# Patient Record
Sex: FEMALE | Race: WHITE | NOT HISPANIC OR LATINO | Employment: FULL TIME | URBAN - METROPOLITAN AREA
[De-identification: names, ages, dates, MRNs, and addresses within clinical notes are randomized per-mention and may not be internally consistent; named-entity substitution may affect disease eponyms.]

---

## 2023-09-27 LAB — URINE CULTURE: NORMAL

## 2023-11-21 ENCOUNTER — OFFICE VISIT (OUTPATIENT)
Dept: PRIMARY CARE | Facility: CLINIC | Age: 33
End: 2023-11-21
Payer: COMMERCIAL

## 2023-11-21 VITALS
DIASTOLIC BLOOD PRESSURE: 108 MMHG | SYSTOLIC BLOOD PRESSURE: 140 MMHG | TEMPERATURE: 96.9 F | HEIGHT: 64 IN | WEIGHT: 260.1 LBS | OXYGEN SATURATION: 99 % | BODY MASS INDEX: 44.41 KG/M2 | HEART RATE: 79 BPM

## 2023-11-21 DIAGNOSIS — F41.1 GENERALIZED ANXIETY DISORDER: ICD-10-CM

## 2023-11-21 DIAGNOSIS — E66.01 CLASS 3 SEVERE OBESITY DUE TO EXCESS CALORIES WITH SERIOUS COMORBIDITY AND BODY MASS INDEX (BMI) OF 40.0 TO 44.9 IN ADULT (MULTI): ICD-10-CM

## 2023-11-21 DIAGNOSIS — K21.9 GASTROESOPHAGEAL REFLUX DISEASE, UNSPECIFIED WHETHER ESOPHAGITIS PRESENT: ICD-10-CM

## 2023-11-21 DIAGNOSIS — I10 BENIGN ESSENTIAL HYPERTENSION: Primary | ICD-10-CM

## 2023-11-21 DIAGNOSIS — E78.2 MIXED HYPERLIPIDEMIA: ICD-10-CM

## 2023-11-21 PROBLEM — G43.909 MIGRAINES: Status: ACTIVE | Noted: 2023-11-21

## 2023-11-21 PROBLEM — N94.6 DYSMENORRHEA: Status: ACTIVE | Noted: 2023-11-21

## 2023-11-21 PROBLEM — J31.0 CHRONIC RHINITIS: Status: ACTIVE | Noted: 2023-11-21

## 2023-11-21 PROBLEM — J34.2 NASAL SEPTAL DEVIATION: Status: ACTIVE | Noted: 2023-11-21

## 2023-11-21 PROBLEM — R00.2 PALPITATIONS: Status: ACTIVE | Noted: 2023-11-21

## 2023-11-21 PROBLEM — M54.50 LUMBAGO: Status: ACTIVE | Noted: 2023-11-21

## 2023-11-21 PROBLEM — F41.9 ANXIETY DISORDER: Status: ACTIVE | Noted: 2023-11-21

## 2023-11-21 PROBLEM — J30.2 SEASONAL ALLERGIC RHINITIS: Status: ACTIVE | Noted: 2023-11-21

## 2023-11-21 PROCEDURE — 3077F SYST BP >= 140 MM HG: CPT | Performed by: INTERNAL MEDICINE

## 2023-11-21 PROCEDURE — 99214 OFFICE O/P EST MOD 30 MIN: CPT | Performed by: INTERNAL MEDICINE

## 2023-11-21 PROCEDURE — 3008F BODY MASS INDEX DOCD: CPT | Performed by: INTERNAL MEDICINE

## 2023-11-21 PROCEDURE — 3080F DIAST BP >= 90 MM HG: CPT | Performed by: INTERNAL MEDICINE

## 2023-11-21 PROCEDURE — 80307 DRUG TEST PRSMV CHEM ANLYZR: CPT

## 2023-11-21 PROCEDURE — 1036F TOBACCO NON-USER: CPT | Performed by: INTERNAL MEDICINE

## 2023-11-21 RX ORDER — HYDROCHLOROTHIAZIDE 25 MG/1
25 TABLET ORAL DAILY
COMMUNITY
End: 2023-11-21 | Stop reason: SDUPTHER

## 2023-11-21 RX ORDER — LORAZEPAM 0.5 MG/1
0.5 TABLET ORAL EVERY 6 HOURS PRN
Qty: 30 TABLET | Refills: 0 | Status: SHIPPED | OUTPATIENT
Start: 2023-11-21

## 2023-11-21 RX ORDER — HYDROCHLOROTHIAZIDE 25 MG/1
25 TABLET ORAL DAILY
Qty: 90 TABLET | Refills: 3 | Status: SHIPPED | OUTPATIENT
Start: 2023-11-21 | End: 2024-11-20

## 2023-11-21 RX ORDER — NAPROXEN 500 MG/1
1 TABLET ORAL EVERY 12 HOURS PRN
COMMUNITY
Start: 2020-05-13

## 2023-11-21 RX ORDER — CITALOPRAM 10 MG/1
10 TABLET ORAL DAILY
COMMUNITY
End: 2023-11-21 | Stop reason: SDUPTHER

## 2023-11-21 RX ORDER — DESOGESTREL AND ETHINYL ESTRADIOL 7 DAYS X 3
1 KIT ORAL DAILY
COMMUNITY

## 2023-11-21 RX ORDER — LORAZEPAM 0.5 MG/1
0.5 TABLET ORAL EVERY 6 HOURS PRN
COMMUNITY
End: 2023-11-21 | Stop reason: SDUPTHER

## 2023-11-21 RX ORDER — CITALOPRAM 10 MG/1
10 TABLET ORAL DAILY
Qty: 90 TABLET | Refills: 3 | Status: SHIPPED | OUTPATIENT
Start: 2023-11-21

## 2023-11-21 RX ORDER — NEBIVOLOL 10 MG/1
10 TABLET ORAL DAILY
Qty: 90 TABLET | Refills: 3 | Status: SHIPPED | OUTPATIENT
Start: 2023-11-21 | End: 2024-11-20

## 2023-11-21 RX ORDER — SEMAGLUTIDE 0.25 MG/.5ML
0.25 INJECTION, SOLUTION SUBCUTANEOUS
Qty: 2 ML | Refills: 0 | Status: SHIPPED | OUTPATIENT
Start: 2023-11-21 | End: 2023-12-13

## 2023-11-21 RX ORDER — OMEPRAZOLE 20 MG/1
20 CAPSULE, DELAYED RELEASE ORAL DAILY
Qty: 90 CAPSULE | Refills: 3 | Status: SHIPPED | OUTPATIENT
Start: 2023-11-21 | End: 2024-11-20

## 2023-11-21 RX ORDER — OMEPRAZOLE 20 MG/1
20 CAPSULE, DELAYED RELEASE ORAL DAILY
COMMUNITY
End: 2023-11-21 | Stop reason: SDUPTHER

## 2023-11-21 RX ORDER — ATENOLOL 50 MG/1
50 TABLET ORAL DAILY
COMMUNITY
End: 2023-11-21

## 2023-11-21 ASSESSMENT — PATIENT HEALTH QUESTIONNAIRE - PHQ9
2. FEELING DOWN, DEPRESSED OR HOPELESS: NOT AT ALL
SUM OF ALL RESPONSES TO PHQ9 QUESTIONS 1 AND 2: 0
1. LITTLE INTEREST OR PLEASURE IN DOING THINGS: NOT AT ALL

## 2023-11-21 NOTE — PROGRESS NOTES
Subjective   Patient ID: Sherita Will is a 33 y.o. female who presents for Anxiety.    HPI     History reviewed and updated.  Needs refills on medication.  Has been a stressful year.  Her landlord decided to sell the home she was living in and she had to move out.  She is now in a new [facility], but misses her old place.  She is not fully unpacked, does not know where her blood pressure cuff is at home.  She is trying to follow healthy diet and goes on walks with dog.  Very hard for her to sustain any permanent weight loss.  She would like to try Wegovy.  She also wants to restart Ativan PRN for anxiety - helps her a lot.  She was unable to come in every 90 days last year due to her schedule, but she wants to get back on track because the medication helps her.    OARRS:  Payal Jewell MD on 11/21/2023  3:09 PM  I have personally reviewed the OARRS report for Sherita Will. I have considered the risks of abuse, dependence, addiction and diversion and I believe that it is clinically appropriate for Sherita Will to be prescribed this medication    Is the patient prescribed a combination of a benzodiazepine and opioid?  No    Last Urine Drug Screen / ordered today: Yes  Recent Results (from the past 8760 hour(s))   Drug Screen, Urine With Reflex to Confirmation    Collection Time: 11/21/23  3:32 PM   Result Value Ref Range    Amphetamine Screen, Urine Presumptive Negative Presumptive Negative    Barbiturate Screen, Urine Presumptive Negative Presumptive Negative    Benzodiazepines Screen, Urine Presumptive Negative Presumptive Negative    Cannabinoid Screen, Urine Presumptive Negative Presumptive Negative    Cocaine Metabolite Screen, Urine Presumptive Negative Presumptive Negative    Fentanyl Screen, Urine Presumptive Negative Presumptive Negative    Opiate Screen, Urine Presumptive Negative Presumptive Negative    Oxycodone Screen, Urine Presumptive Negative Presumptive Negative    PCP Screen, Urine  Presumptive Negative Presumptive Negative   OPIATE/OPIOID/BENZO PRESCRIPTION COMPLIANCE    Collection Time: 12/07/22  3:43 PM   Result Value Ref Range    DRUG SCREEN COMMENT URINE SEE BELOW     Creatine, Urine 162.6 mg/dL    Amphetamine Screen, Urine PRESUMPTIVE NEGATIVE NEGATIVE    Barbiturate Screen, Urine PRESUMPTIVE NEGATIVE NEGATIVE    Cannabinoid Screen, Urine PRESUMPTIVE NEGATIVE NEGATIVE    Cocaine Screen, Urine PRESUMPTIVE NEGATIVE NEGATIVE    PCP Screen, Urine PRESUMPTIVE NEGATIVE NEGATIVE    7-Aminoclonazepam <25 Cutoff <25 ng/mL    Alpha-Hydroxyalprazolam <25 Cutoff <25 ng/mL    Alpha-Hydroxymidazolam <25 Cutoff <25 ng/mL    Alprazolam <25 Cutoff <25 ng/mL    Chlordiazepoxide <25 Cutoff <25 ng/mL    Clonazepam <25 Cutoff <25 ng/mL    Diazepam <25 Cutoff <25 ng/mL    Lorazepam 61 (A) Cutoff <25 ng/mL    Midazolam <25 Cutoff <25 ng/mL    Nordiazepam <25 Cutoff <25 ng/mL    Oxazepam <25 Cutoff <25 ng/mL    Temazepam <25 Cutoff <25 ng/mL    Zolpidem <25 Cutoff <25 ng/mL    Zolpidem Metabolite (ZCA) <25 Cutoff <25 ng/mL    6-Acetylmorphine <25 Cutoff <25 ng/mL    Codeine <50 Cutoff <50 ng/mL    Hydrocodone <25 Cutoff <25 ng/mL    Hydromorphone <25 Cutoff <25 ng/mL    Morphine Urine <50 Cutoff <50 ng/mL    Norhydrocodone <25 Cutoff <25 ng/mL    Noroxycodone <25 Cutoff <25 ng/mL    Oxycodone <25 Cutoff <25 ng/mL    Oxymorphone <25 Cutoff <25 ng/mL    Tramadol <50 Cutoff <50 ng/mL    O-Desmethyltramadol <50 Cutoff <50 ng/mL    Fentanyl <2.5 Cutoff<2.5 ng/mL    Norfentanyl <2.5 Cutoff<2.5 ng/mL    METHADONE CONFIRMATION,URINE <25 Cutoff <25 ng/mL    EDDP <25 Cutoff <25 ng/mL     Results are as expected.         Controlled Substance Agreement:  Date of the Last Agreement: 11/21/2023  Reviewed Controlled Substance Agreement including but not limited to the benefits, risks, and alternatives to treatment with a Controlled Substance medication(s).    Benzodiazepines:  What is the patient's goal of therapy? Relief of  "acute anxiety  Is this being achieved with current treatment? yes    RA-7:  No data recorded    Activities of Daily Living:   Is your overall impression that this patient is benefiting (symptom reduction outweighs side effects) from benzodiazepine therapy? Yes     1. Physical Functioning: Same  2. Family Relationship: same    3. Social Relationship: Same  4. Mood: Better  5. Sleep Patterns: Better  6. Overall Function: Better    Review of Systems   Constitutional:  Negative for chills, fatigue and fever.   HENT:  Negative for sore throat.    Respiratory:  Negative for cough and shortness of breath.    Cardiovascular:  Negative for chest pain, palpitations and leg swelling.   Gastrointestinal:  Negative for abdominal pain, constipation, diarrhea, nausea and vomiting.   Genitourinary:  Negative for dysuria and hematuria.   Musculoskeletal:  Negative for arthralgias, back pain and gait problem.   Skin:  Negative for rash.   Neurological:  Negative for dizziness, light-headedness and headaches.   Psychiatric/Behavioral:  Negative for confusion. The patient is nervous/anxious.        Objective   BP (!) 140/108   Pulse 79   Temp 36.1 °C (96.9 °F)   Ht 1.626 m (5' 4\")   Wt 118 kg (260 lb 1.6 oz)   SpO2 99%   BMI 44.65 kg/m²     Physical Exam  Constitutional:       Appearance: Normal appearance. She is obese.   HENT:      Head: Normocephalic and atraumatic.      Right Ear: Tympanic membrane and ear canal normal.      Left Ear: Tympanic membrane and ear canal normal.   Cardiovascular:      Rate and Rhythm: Normal rate and regular rhythm.      Pulses: Normal pulses.      Heart sounds: No murmur heard.  Pulmonary:      Effort: Pulmonary effort is normal. No respiratory distress.      Breath sounds: Normal breath sounds. No wheezing.   Abdominal:      General: There is no distension.      Palpations: Abdomen is soft.      Tenderness: There is no abdominal tenderness.   Musculoskeletal:      Right lower leg: No edema.     "  Left lower leg: No edema.   Skin:     Findings: No rash.   Neurological:      General: No focal deficit present.      Mental Status: She is alert and oriented to person, place, and time. Mental status is at baseline.   Psychiatric:         Mood and Affect: Mood normal.         Behavior: Behavior normal.         Thought Content: Thought content normal.         Judgment: Judgment normal.         Assessment/Plan   Problem List Items Addressed This Visit             ICD-10-CM    Anxiety disorder F41.9    Relevant Medications    citalopram (CeleXA) 10 mg tablet    LORazepam (Ativan) 0.5 mg tablet    Other Relevant Orders    Drug Screen, Urine With Reflex to Confirmation (Completed)    Benign essential hypertension - Primary I10    Relevant Medications    nebivolol (Bystolic) 10 mg tablet    hydroCHLOROthiazide (HYDRODiuril) 25 mg tablet    Other Relevant Orders    CBC and Auto Differential    GERD (gastroesophageal reflux disease) K21.9    Relevant Medications    omeprazole (PriLOSEC) 20 mg DR capsule    Mixed hyperlipidemia E78.2    Relevant Orders    Comprehensive Metabolic Panel    Lipid Panel     Other Visit Diagnoses         Codes    Class 3 severe obesity due to excess calories with serious comorbidity and body mass index (BMI) of 40.0 to 44.9 in adult (CMS/[facility])     E66.01, Z68.41    Relevant Medications    semaglutide, weight loss, (Wegovy) 0.25 mg/0.5 mL pen injector            Anxiety disorder - well controlled with citalopram and lorazepam PRN.  Controlled substance agreement reviewed and updated 11/21/23.  OARRS reviewed.  Urine Drug Screen 11/21/23.  Lorazepam refilled.  Follow-up in 90 days.    Hypertension - uncontrolled / not at goal.  Change atenolol to Bystolic, continue hydrochlorothiazide.    Mixed hyperlipidemia- check labs.    Obesity, BMI 44 - has tried tried traditional diet and exercise without sustained weight.  I recommend Wegovy, Rx sent.    GERD - refill sent for omeprazole.    Follow-up in  90 days and PRN.

## 2023-11-22 LAB
AMPHETAMINES UR QL SCN: NORMAL
BARBITURATES UR QL SCN: NORMAL
BENZODIAZ UR QL SCN: NORMAL
BZE UR QL SCN: NORMAL
CANNABINOIDS UR QL SCN: NORMAL
FENTANYL+NORFENTANYL UR QL SCN: NORMAL
OPIATES UR QL SCN: NORMAL
OXYCODONE+OXYMORPHONE UR QL SCN: NORMAL
PCP UR QL SCN: NORMAL

## 2023-11-22 ASSESSMENT — ENCOUNTER SYMPTOMS
NERVOUS/ANXIOUS: 1
PALPITATIONS: 0
BACK PAIN: 0
SHORTNESS OF BREATH: 0
FATIGUE: 0
DYSURIA: 0
DIZZINESS: 0
HEADACHES: 0
CONSTIPATION: 0
DIARRHEA: 0
ARTHRALGIAS: 0
CONFUSION: 0
CHILLS: 0
COUGH: 0
VOMITING: 0
LIGHT-HEADEDNESS: 0
SORE THROAT: 0
HEMATURIA: 0
ABDOMINAL PAIN: 0
FEVER: 0
NAUSEA: 0

## 2024-02-15 ENCOUNTER — APPOINTMENT (OUTPATIENT)
Dept: PRIMARY CARE | Facility: CLINIC | Age: 34
End: 2024-02-15
Payer: COMMERCIAL

## 2024-03-04 ENCOUNTER — APPOINTMENT (OUTPATIENT)
Dept: PRIMARY CARE | Facility: CLINIC | Age: 34
End: 2024-03-04
Payer: COMMERCIAL

## 2024-04-01 ENCOUNTER — APPOINTMENT (OUTPATIENT)
Dept: PRIMARY CARE | Facility: CLINIC | Age: 34
End: 2024-04-01
Payer: COMMERCIAL

## 2024-04-02 ENCOUNTER — APPOINTMENT (OUTPATIENT)
Dept: PRIMARY CARE | Facility: CLINIC | Age: 34
End: 2024-04-02
Payer: COMMERCIAL

## 2024-04-29 ENCOUNTER — APPOINTMENT (OUTPATIENT)
Dept: PRIMARY CARE | Facility: CLINIC | Age: 34
End: 2024-04-29
Payer: COMMERCIAL

## 2024-06-04 ENCOUNTER — APPOINTMENT (OUTPATIENT)
Dept: PRIMARY CARE | Facility: CLINIC | Age: 34
End: 2024-06-04
Payer: COMMERCIAL

## 2024-06-17 ENCOUNTER — APPOINTMENT (OUTPATIENT)
Dept: PRIMARY CARE | Facility: CLINIC | Age: 34
End: 2024-06-17
Payer: COMMERCIAL

## 2024-06-26 ENCOUNTER — APPOINTMENT (OUTPATIENT)
Dept: PRIMARY CARE | Facility: CLINIC | Age: 34
End: 2024-06-26
Payer: COMMERCIAL

## 2024-07-08 ENCOUNTER — APPOINTMENT (OUTPATIENT)
Dept: PRIMARY CARE | Facility: CLINIC | Age: 34
End: 2024-07-08
Payer: COMMERCIAL

## 2024-08-15 ENCOUNTER — APPOINTMENT (OUTPATIENT)
Dept: PRIMARY CARE | Facility: CLINIC | Age: 34
End: 2024-08-15
Payer: COMMERCIAL

## 2024-08-19 ENCOUNTER — APPOINTMENT (OUTPATIENT)
Dept: PRIMARY CARE | Facility: CLINIC | Age: 34
End: 2024-08-19
Payer: COMMERCIAL

## 2024-09-09 ENCOUNTER — APPOINTMENT (OUTPATIENT)
Dept: PRIMARY CARE | Facility: CLINIC | Age: 34
End: 2024-09-09
Payer: COMMERCIAL

## 2024-09-30 ENCOUNTER — APPOINTMENT (OUTPATIENT)
Dept: PRIMARY CARE | Facility: CLINIC | Age: 34
End: 2024-09-30
Payer: COMMERCIAL

## 2024-10-07 ENCOUNTER — APPOINTMENT (OUTPATIENT)
Dept: PRIMARY CARE | Facility: CLINIC | Age: 34
End: 2024-10-07
Payer: COMMERCIAL

## 2024-10-31 ENCOUNTER — APPOINTMENT (OUTPATIENT)
Dept: PRIMARY CARE | Facility: CLINIC | Age: 34
End: 2024-10-31
Payer: COMMERCIAL

## 2024-11-25 DIAGNOSIS — I10 BENIGN ESSENTIAL HYPERTENSION: ICD-10-CM

## 2024-11-25 RX ORDER — NEBIVOLOL 10 MG/1
10 TABLET ORAL DAILY
Qty: 90 TABLET | Refills: 0 | Status: SHIPPED | OUTPATIENT
Start: 2024-11-25

## 2024-12-02 ENCOUNTER — APPOINTMENT (OUTPATIENT)
Dept: PRIMARY CARE | Facility: CLINIC | Age: 34
End: 2024-12-02
Payer: COMMERCIAL

## 2024-12-02 VITALS
DIASTOLIC BLOOD PRESSURE: 95 MMHG | WEIGHT: 228.5 LBS | TEMPERATURE: 97.3 F | HEIGHT: 64 IN | SYSTOLIC BLOOD PRESSURE: 116 MMHG | BODY MASS INDEX: 39.01 KG/M2 | OXYGEN SATURATION: 99 % | HEART RATE: 91 BPM

## 2024-12-02 DIAGNOSIS — I10 BENIGN ESSENTIAL HYPERTENSION: ICD-10-CM

## 2024-12-02 DIAGNOSIS — K21.9 GASTROESOPHAGEAL REFLUX DISEASE, UNSPECIFIED WHETHER ESOPHAGITIS PRESENT: ICD-10-CM

## 2024-12-02 DIAGNOSIS — F41.1 GENERALIZED ANXIETY DISORDER: Primary | ICD-10-CM

## 2024-12-02 DIAGNOSIS — Z79.899 CHRONIC PRESCRIPTION BENZODIAZEPINE USE: ICD-10-CM

## 2024-12-02 PROCEDURE — 3008F BODY MASS INDEX DOCD: CPT | Performed by: INTERNAL MEDICINE

## 2024-12-02 PROCEDURE — 3080F DIAST BP >= 90 MM HG: CPT | Performed by: INTERNAL MEDICINE

## 2024-12-02 PROCEDURE — 80307 DRUG TEST PRSMV CHEM ANLYZR: CPT

## 2024-12-02 PROCEDURE — 99214 OFFICE O/P EST MOD 30 MIN: CPT | Performed by: INTERNAL MEDICINE

## 2024-12-02 PROCEDURE — 1036F TOBACCO NON-USER: CPT | Performed by: INTERNAL MEDICINE

## 2024-12-02 PROCEDURE — 3074F SYST BP LT 130 MM HG: CPT | Performed by: INTERNAL MEDICINE

## 2024-12-02 RX ORDER — NEBIVOLOL 10 MG/1
10 TABLET ORAL DAILY
Qty: 30 TABLET | Refills: 11 | Status: SHIPPED | OUTPATIENT
Start: 2024-12-02

## 2024-12-02 RX ORDER — CITALOPRAM 10 MG/1
10 TABLET ORAL DAILY
Qty: 30 TABLET | Refills: 11 | Status: SHIPPED | OUTPATIENT
Start: 2024-12-02

## 2024-12-02 RX ORDER — HYDROCHLOROTHIAZIDE 25 MG/1
25 TABLET ORAL DAILY
Qty: 30 TABLET | Refills: 11 | Status: SHIPPED | OUTPATIENT
Start: 2024-12-02 | End: 2025-12-02

## 2024-12-02 RX ORDER — LORAZEPAM 0.5 MG/1
0.5 TABLET ORAL EVERY 6 HOURS PRN
Qty: 30 TABLET | Refills: 0 | Status: SHIPPED | OUTPATIENT
Start: 2024-12-02

## 2024-12-02 RX ORDER — OMEPRAZOLE 20 MG/1
20 CAPSULE, DELAYED RELEASE ORAL DAILY
Qty: 30 CAPSULE | Refills: 11 | Status: SHIPPED | OUTPATIENT
Start: 2024-12-02 | End: 2025-12-02

## 2024-12-02 ASSESSMENT — ENCOUNTER SYMPTOMS
SHORTNESS OF BREATH: 0
CHILLS: 0
BACK PAIN: 0
PALPITATIONS: 0
ABDOMINAL PAIN: 0
HEMATURIA: 0
CONSTIPATION: 0
ARTHRALGIAS: 0
DYSURIA: 0
VOMITING: 0
CONFUSION: 0
SORE THROAT: 0
DIARRHEA: 0
COUGH: 0
DIZZINESS: 0
DYSPHORIC MOOD: 0
LIGHT-HEADEDNESS: 0
NAUSEA: 0
FATIGUE: 0
FEVER: 0
HEADACHES: 0

## 2024-12-02 ASSESSMENT — PATIENT HEALTH QUESTIONNAIRE - PHQ9
SUM OF ALL RESPONSES TO PHQ9 QUESTIONS 1 AND 2: 0
2. FEELING DOWN, DEPRESSED OR HOPELESS: NOT AT ALL
1. LITTLE INTEREST OR PLEASURE IN DOING THINGS: NOT AT ALL

## 2024-12-02 NOTE — PROGRESS NOTES
CHIEF COMPLAINT  Anxiety    HISTORY OF PRESENT ILLNESS  Sherita Will is a 34 y.o. female presents today for follow up of Anxiety    HPI    Last visit about 1 year ago.  She would like to get re-established for lorazepam.  Needs refills on all medications.  She is using compounded semaglutide with excellent results - has lost 40+ pounds and is feeling much better.      OARRS:  Payal Jewell MD on 12/2/2024  3:34 PM  I have personally reviewed the OARRS report for Sherita Will. I have considered the risks of abuse, dependence, addiction and diversion and I believe that it is clinically appropriate for Sherita Will to be prescribed this medication    Is the patient prescribed a combination of a benzodiazepine and opioid?  No    Last Urine Drug Screen / ordered today: Yes  No results found for this or any previous visit (from the past 8760 hours).  N/A    Controlled Substance Agreement:  Date of the Last Agreement: 12/2/2024  Reviewed Controlled Substance Agreement including but not limited to the benefits, risks, and alternatives to treatment with a Controlled Substance medication(s).    Benzodiazepines:  What is the patient's goal of therapy? Relief of occasional acute anxiety   Is this being achieved with current treatment? yes    Activities of Daily Living:   Is your overall impression that this patient is benefiting (symptom reduction outweighs side effects) from benzodiazepine therapy? Yes     1. Physical Functioning: Same  2. Family Relationship: Same  3. Social Relationship: Same  4. Mood: Same  5. Sleep Patterns: Same  6. Overall Function: Same    REVIEW OF SYSTEMS  Review of Systems   Constitutional:  Negative for chills, fatigue and fever.   HENT:  Negative for sore throat.    Respiratory:  Negative for cough and shortness of breath.    Cardiovascular:  Negative for chest pain, palpitations and leg swelling.   Gastrointestinal:  Negative for abdominal pain, constipation, diarrhea, nausea and  "vomiting.   Genitourinary:  Negative for dysuria and hematuria.   Musculoskeletal:  Negative for arthralgias, back pain and gait problem.   Skin:  Negative for rash.   Neurological:  Negative for dizziness, light-headedness and headaches.   Psychiatric/Behavioral:  Negative for confusion and dysphoric mood.        ALLERGIES  Patient has no known allergies.    MEDICATIONS  Current Outpatient Medications   Medication Instructions    citalopram (CELEXA) 10 mg, oral, Daily    hydroCHLOROthiazide (HYDRODIURIL) 25 mg, oral, Daily    LORazepam (ATIVAN) 0.5 mg, oral, Every 6 hours PRN    naproxen (Naprosyn) 500 mg tablet 1 tablet, Every 12 hours PRN    nebivolol (BYSTOLIC) 10 mg, oral, Daily    omeprazole (PRILOSEC) 20 mg, oral, Daily    semaglutide (OZEMPIC) 1 mg/dose (4 mg/3 mL) pen injector INJECT 25 UNITS SUBCUTANEOUSLY ONCE A WEEK    Velivet Triphasic Regimen, 28, 0.1/.125/.15-25 mg-mcg tablet 1 tablet, Daily       TOBACCO USE  Social History     Tobacco Use   Smoking Status Never   Smokeless Tobacco Never       DEPRESSION SCREEN  Over the past 2 weeks, how often have you been bothered by any of the following problems?  Little interest or pleasure in doing things: Not at all  Feeling down, depressed, or hopeless: Not at all    SURGICAL HISTORY  Past Surgical History:  12/22/2014: MOUTH SURGERY      Comment:  Oral Surgery Tooth Extraction  12/22/2014: OTHER SURGICAL HISTORY      Comment:  Labioplasty       OBJECTIVE    BP (!) 116/95   Pulse 91   Temp 36.3 °C (97.3 °F)   Ht 1.626 m (5' 4\")   Wt 104 kg (228 lb 8 oz)   SpO2 99%   BMI 39.22 kg/m²    BMI: Estimated body mass index is 39.22 kg/m² as calculated from the following:    Height as of this encounter: 1.626 m (5' 4\").    Weight as of this encounter: 104 kg (228 lb 8 oz).    BP Readings from Last 3 Encounters:   12/02/24 (!) 116/95   11/21/23 (!) 140/108   12/07/22 (!) 121/97      Wt Readings from Last 3 Encounters:   12/02/24 104 kg (228 lb 8 oz)   11/21/23 " 118 kg (260 lb 1.6 oz)   12/07/22 120 kg (265 lb 9 oz)        PHYSICAL EXAM  Physical Exam  Constitutional:       Appearance: Normal appearance. She is obese.   HENT:      Head: Normocephalic and atraumatic.   Cardiovascular:      Rate and Rhythm: Normal rate and regular rhythm.      Heart sounds: No murmur heard.  Pulmonary:      Effort: Pulmonary effort is normal. No respiratory distress.      Breath sounds: Normal breath sounds. No wheezing.   Abdominal:      General: There is no distension.      Palpations: Abdomen is soft.      Tenderness: There is no abdominal tenderness.   Musculoskeletal:      Right lower leg: No edema.      Left lower leg: No edema.   Neurological:      General: No focal deficit present.      Mental Status: She is alert and oriented to person, place, and time. Mental status is at baseline.   Psychiatric:         Mood and Affect: Mood normal.         Behavior: Behavior normal.         Thought Content: Thought content normal.         Judgment: Judgment normal.          ASSESSMENT AND PLAN  Assessment/Plan   Problem List Items Addressed This Visit       Anxiety disorder - Primary    Relevant Medications    citalopram (CeleXA) 10 mg tablet    LORazepam (Ativan) 0.5 mg tablet    Other Relevant Orders    Drug Screen, Urine With Reflex to Confirmation    Benign essential hypertension    Relevant Medications    nebivolol (Bystolic) 10 mg tablet    hydroCHLOROthiazide (HYDRODiuril) 25 mg tablet    GERD (gastroesophageal reflux disease)    Relevant Medications    omeprazole (PriLOSEC) 20 mg DR capsule    Chronic prescription benzodiazepine use    Relevant Orders    Drug Screen, Urine With Reflex to Confirmation     Generalized anxiety with acute flare-ups - Continue citalopram, also uses lorazepam occasionally for acute anxiety.  Controlled substance agreement updated 12/2/24.  OARRS reviewed.  Urine Drug Screen 12/2/24.  Lorazepam refilled.  Follow-up 90 days.    Hypertension - better controlled per  home monitoring, continue current meds, Rx sent.    GERD - omeprazole helps, Rx refilled.    Follow-up 90 days.

## 2024-12-03 LAB
AMPHETAMINES UR QL SCN: NORMAL
BARBITURATES UR QL SCN: NORMAL
BENZODIAZ UR QL SCN: NORMAL
BZE UR QL SCN: NORMAL
CANNABINOIDS UR QL SCN: NORMAL
FENTANYL+NORFENTANYL UR QL SCN: NORMAL
METHADONE UR QL SCN: NORMAL
OPIATES UR QL SCN: NORMAL
OXYCODONE+OXYMORPHONE UR QL SCN: NORMAL
PCP UR QL SCN: NORMAL

## 2025-03-03 ENCOUNTER — APPOINTMENT (OUTPATIENT)
Dept: PRIMARY CARE | Facility: CLINIC | Age: 35
End: 2025-03-03
Payer: COMMERCIAL

## 2025-03-09 NOTE — PROGRESS NOTES
CHIEF COMPLAINT  Anxiety and Pain (Fell over the weekend and injured small toe on LT LE & stoved finger RT UE. )    HISTORY OF PRESENT ILLNESS  Sherita Will is a 35 y.o. female presents today for follow up of Anxiety and Pain (Fell over the weekend and injured small toe on LT LE & stoved finger RT UE. )    HPI    Stable on lorazepam, uses intermittently for anxiety.  Needs refill.    Over weekend, she tripped in her bedroom and jammed her right thumb.  Has had some pain in the palmar aspect of thumb since then, painful to  things, like steering well and phone.     Last refill 12/2/24 lorazepam 0.5 mg, #30, 1.88 LME    OARRS:  Payal Jewell MD on 3/10/2025 12:58 PM  I have personally reviewed the OARRS report for Sherita Will. I have considered the risks of abuse, dependence, addiction and diversion and I believe that it is clinically appropriate for Sherita Will to be prescribed this medication    Is the patient prescribed a combination of a benzodiazepine and opioid?  No    Last Urine Drug Screen / ordered today: No  Recent Results (from the past 8760 hours)   Drug Screen, Urine With Reflex to Confirmation    Collection Time: 12/02/24  3:09 PM   Result Value Ref Range    Amphetamine Screen, Urine Presumptive Negative Presumptive Negative    Barbiturate Screen, Urine Presumptive Negative Presumptive Negative    Benzodiazepines Screen, Urine Presumptive Negative Presumptive Negative    Cannabinoid Screen, Urine Presumptive Negative Presumptive Negative    Cocaine Metabolite Screen, Urine Presumptive Negative Presumptive Negative    Fentanyl Screen, Urine Presumptive Negative Presumptive Negative    Opiate Screen, Urine Presumptive Negative Presumptive Negative    Oxycodone Screen, Urine Presumptive Negative Presumptive Negative    PCP Screen, Urine Presumptive Negative Presumptive Negative    Methadone Screen, Urine Presumptive Negative Presumptive Negative     Results are as expected.          Controlled Substance Agreement:  Date of the Last Agreement: 12/2/24  Reviewed Controlled Substance Agreement including but not limited to the benefits, risks, and alternatives to treatment with a Controlled Substance medication(s).    Benzodiazepines:  What is the patient's goal of therapy? Relief of acute anxiety   Is this being achieved with current treatment? yes      Activities of Daily Living:   Is your overall impression that this patient is benefiting (symptom reduction outweighs side effects) from benzodiazepine therapy?     1. Physical Functioning: Better  2. Family Relationship: Better  3. Social Relationship: Better  4. Mood: Better  5. Sleep Patterns: Better  6. Overall Function: Better    REVIEW OF SYSTEMS  Review of Systems   Constitutional:  Negative for chills, fatigue and fever.   Respiratory:  Negative for cough and shortness of breath.    Cardiovascular:  Negative for chest pain, palpitations and leg swelling.   Gastrointestinal:  Negative for abdominal pain, constipation, diarrhea, nausea and vomiting.   Genitourinary:  Negative for dysuria and hematuria.   Musculoskeletal:  Positive for arthralgias.   Skin:  Negative for rash.   Neurological:  Negative for dizziness, light-headedness and headaches.   Psychiatric/Behavioral:  Negative for confusion and dysphoric mood.        ALLERGIES  Patient has no known allergies.    MEDICATIONS  Current Outpatient Medications   Medication Instructions    citalopram (CELEXA) 10 mg, oral, Daily    hydroCHLOROthiazide (HYDRODIURIL) 25 mg, oral, Daily    LORazepam (ATIVAN) 0.5 mg, oral, Every 6 hours PRN    naproxen (Naprosyn) 500 mg tablet 1 tablet, Every 12 hours PRN    nebivolol (BYSTOLIC) 10 mg, oral, Daily    omeprazole (PRILOSEC) 20 mg, oral, Daily    semaglutide (OZEMPIC) 1 mg/dose (4 mg/3 mL) pen injector INJECT 25 UNITS SUBCUTANEOUSLY ONCE A WEEK    Velivet Triphasic Regimen, 28, 0.1/.125/.15-25 mg-mcg tablet 1 tablet, Daily       TOBACCO  "USE  Social History     Tobacco Use   Smoking Status Never   Smokeless Tobacco Never       DEPRESSION SCREEN  Over the past 2 weeks, how often have you been bothered by any of the following problems?  Little interest or pleasure in doing things: Not at all  Feeling down, depressed, or hopeless: More than half the days (Tx'd medically for depression)    SURGICAL HISTORY  Past Surgical History:  12/22/2014: MOUTH SURGERY      Comment:  Oral Surgery Tooth Extraction  12/22/2014: OTHER SURGICAL HISTORY      Comment:  Labioplasty       OBJECTIVE    BP (!) 123/92   Pulse 93   Temp 36.6 °C (97.9 °F)   Ht 1.626 m (5' 4\")   Wt 103 kg (227 lb 14.4 oz)   SpO2 96%   BMI 39.12 kg/m²    BMI: Estimated body mass index is 39.12 kg/m² as calculated from the following:    Height as of this encounter: 1.626 m (5' 4\").    Weight as of this encounter: 103 kg (227 lb 14.4 oz).    BP Readings from Last 3 Encounters:   03/10/25 (!) 123/92   12/02/24 (!) 116/95   11/21/23 (!) 140/108      Wt Readings from Last 3 Encounters:   03/10/25 103 kg (227 lb 14.4 oz)   12/02/24 104 kg (228 lb 8 oz)   11/21/23 118 kg (260 lb 1.6 oz)        PHYSICAL EXAM  Physical Exam  Constitutional:       Appearance: Normal appearance. She is obese.   HENT:      Head: Normocephalic and atraumatic.   Cardiovascular:      Rate and Rhythm: Normal rate and regular rhythm.      Heart sounds: No murmur heard.  Pulmonary:      Effort: Pulmonary effort is normal. No respiratory distress.      Breath sounds: Normal breath sounds. No wheezing.   Musculoskeletal:      Right lower leg: No edema.      Left lower leg: No edema.      Comments: No point tenderness of right forearm  + tenderness of right thenar soft tissue   No obvious bruising   Neurological:      General: No focal deficit present.      Mental Status: She is alert and oriented to person, place, and time. Mental status is at baseline.   Psychiatric:         Mood and Affect: Mood normal.         Behavior: " Behavior normal.         Thought Content: Thought content normal.         Judgment: Judgment normal.          ASSESSMENT AND PLAN  Assessment/Plan   Problem List Items Addressed This Visit       Anxiety disorder - Primary    Relevant Medications    LORazepam (Ativan) 0.5 mg tablet    Chronic prescription benzodiazepine use     Other Visit Diagnoses       Thumb pain, right        Relevant Orders    XR hand right 3+ views    Healthcare maintenance        Relevant Orders    Lipid Panel    Comprehensive Metabolic Panel    CBC and Auto Differential            Generalized anxiety with acute flare-ups - Continue citalopram, also uses lorazepam occasionally for acute anxiety.  Controlled substance agreement updated 12/2/24.  OARRS reviewed.  Urine Drug Screen 12/2/24.  Lorazepam refilled.  Follow-up 90 days.     Hypertension - better controlled per home monitoring, continue current meds,      Right thumb pain after fall - likely soft tissue injury, however given that pain is significant, xray ordered to eval for underlying fracture.  Can use ice, try to rest the thumb, and Advil or Aleve PRN.     Routine fasting labs ordered.      Follow-up 90 days.

## 2025-03-10 ENCOUNTER — APPOINTMENT (OUTPATIENT)
Dept: PRIMARY CARE | Facility: CLINIC | Age: 35
End: 2025-03-10
Payer: COMMERCIAL

## 2025-03-10 VITALS
WEIGHT: 227.9 LBS | TEMPERATURE: 97.9 F | BODY MASS INDEX: 38.91 KG/M2 | DIASTOLIC BLOOD PRESSURE: 92 MMHG | OXYGEN SATURATION: 96 % | HEIGHT: 64 IN | HEART RATE: 93 BPM | SYSTOLIC BLOOD PRESSURE: 123 MMHG

## 2025-03-10 DIAGNOSIS — F41.1 GENERALIZED ANXIETY DISORDER: Primary | ICD-10-CM

## 2025-03-10 DIAGNOSIS — Z79.899 CHRONIC PRESCRIPTION BENZODIAZEPINE USE: ICD-10-CM

## 2025-03-10 DIAGNOSIS — Z00.00 HEALTHCARE MAINTENANCE: ICD-10-CM

## 2025-03-10 DIAGNOSIS — M79.644 THUMB PAIN, RIGHT: ICD-10-CM

## 2025-03-10 DIAGNOSIS — I10 BENIGN ESSENTIAL HYPERTENSION: ICD-10-CM

## 2025-03-10 PROCEDURE — 3008F BODY MASS INDEX DOCD: CPT | Performed by: INTERNAL MEDICINE

## 2025-03-10 PROCEDURE — 3080F DIAST BP >= 90 MM HG: CPT | Performed by: INTERNAL MEDICINE

## 2025-03-10 PROCEDURE — 99214 OFFICE O/P EST MOD 30 MIN: CPT | Performed by: INTERNAL MEDICINE

## 2025-03-10 PROCEDURE — 3074F SYST BP LT 130 MM HG: CPT | Performed by: INTERNAL MEDICINE

## 2025-03-10 PROCEDURE — 99395 PREV VISIT EST AGE 18-39: CPT | Performed by: INTERNAL MEDICINE

## 2025-03-10 PROCEDURE — 1036F TOBACCO NON-USER: CPT | Performed by: INTERNAL MEDICINE

## 2025-03-10 RX ORDER — LORAZEPAM 0.5 MG/1
0.5 TABLET ORAL EVERY 6 HOURS PRN
Qty: 30 TABLET | Refills: 0 | Status: SHIPPED | OUTPATIENT
Start: 2025-03-10

## 2025-03-10 ASSESSMENT — ENCOUNTER SYMPTOMS
NAUSEA: 0
FATIGUE: 0
DIARRHEA: 0
ARTHRALGIAS: 1
CONSTIPATION: 0
FEVER: 0
SHORTNESS OF BREATH: 0
HEADACHES: 0
PALPITATIONS: 0
DYSPHORIC MOOD: 0
ABDOMINAL PAIN: 0
CHILLS: 0
LIGHT-HEADEDNESS: 0
DYSURIA: 0
VOMITING: 0
COUGH: 0
CONFUSION: 0
HEMATURIA: 0
DIZZINESS: 0

## 2025-03-10 ASSESSMENT — PATIENT HEALTH QUESTIONNAIRE - PHQ9
2. FEELING DOWN, DEPRESSED OR HOPELESS: MORE THAN HALF THE DAYS
SUM OF ALL RESPONSES TO PHQ9 QUESTIONS 1 AND 2: 2
1. LITTLE INTEREST OR PLEASURE IN DOING THINGS: NOT AT ALL

## 2025-06-23 ENCOUNTER — APPOINTMENT (OUTPATIENT)
Dept: PRIMARY CARE | Facility: CLINIC | Age: 35
End: 2025-06-23
Payer: COMMERCIAL

## 2025-07-15 ENCOUNTER — APPOINTMENT (OUTPATIENT)
Dept: PRIMARY CARE | Facility: CLINIC | Age: 35
End: 2025-07-15
Payer: COMMERCIAL

## 2025-07-15 VITALS
OXYGEN SATURATION: 98 % | HEIGHT: 64 IN | WEIGHT: 240.1 LBS | BODY MASS INDEX: 40.99 KG/M2 | DIASTOLIC BLOOD PRESSURE: 79 MMHG | SYSTOLIC BLOOD PRESSURE: 115 MMHG | TEMPERATURE: 97.2 F | HEART RATE: 86 BPM

## 2025-07-15 DIAGNOSIS — F41.1 GENERALIZED ANXIETY DISORDER: Primary | ICD-10-CM

## 2025-07-15 DIAGNOSIS — Z79.899 CHRONIC PRESCRIPTION BENZODIAZEPINE USE: ICD-10-CM

## 2025-07-15 DIAGNOSIS — I10 BENIGN ESSENTIAL HYPERTENSION: ICD-10-CM

## 2025-07-15 DIAGNOSIS — E66.813 CLASS 3 SEVERE OBESITY DUE TO EXCESS CALORIES WITH SERIOUS COMORBIDITY AND BODY MASS INDEX (BMI) OF 40.0 TO 44.9 IN ADULT: ICD-10-CM

## 2025-07-15 PROCEDURE — 1036F TOBACCO NON-USER: CPT | Performed by: INTERNAL MEDICINE

## 2025-07-15 PROCEDURE — 3008F BODY MASS INDEX DOCD: CPT | Performed by: INTERNAL MEDICINE

## 2025-07-15 PROCEDURE — 3078F DIAST BP <80 MM HG: CPT | Performed by: INTERNAL MEDICINE

## 2025-07-15 PROCEDURE — 3074F SYST BP LT 130 MM HG: CPT | Performed by: INTERNAL MEDICINE

## 2025-07-15 PROCEDURE — 99214 OFFICE O/P EST MOD 30 MIN: CPT | Performed by: INTERNAL MEDICINE

## 2025-07-15 RX ORDER — LORAZEPAM 0.5 MG/1
0.5 TABLET ORAL EVERY 6 HOURS PRN
Qty: 30 TABLET | Refills: 0 | Status: SHIPPED | OUTPATIENT
Start: 2025-07-15

## 2025-07-15 ASSESSMENT — ENCOUNTER SYMPTOMS
COUGH: 0
FEVER: 0
VOMITING: 0
CONSTIPATION: 0
CHILLS: 0
DIZZINESS: 0
CONFUSION: 0
HEADACHES: 0
DYSPHORIC MOOD: 0
PALPITATIONS: 0
SHORTNESS OF BREATH: 0
NAUSEA: 0
NERVOUS/ANXIOUS: 1
LIGHT-HEADEDNESS: 0
FATIGUE: 1
DIARRHEA: 0
ABDOMINAL PAIN: 0

## 2025-07-15 NOTE — PROGRESS NOTES
CHIEF COMPLAINT  Follow-up and Medication Problem (Not able to get her Ozempic because no longer a shortage so formulary pharmacy is no longer approved. )    HISTORY OF PRESENT ILLNESS  Sherita Will is a 35 y.o. female presents today for follow up of Follow-up and Medication Problem (Not able to get her Ozempic because no longer a shortage so formulary pharmacy is no longer approved. )    HPI    Had been doing really well on compounded semaglutide, however was not longer able to get the medication and stopped a few months ago.  She has gained some weight back and overall does not feel well.  She felt much better when she was on the medication, even was sleep better.  She would like to restart and try Zepbound.     Needs refill on lorazepam.     Last fill lorazepam 3/10/25 #30, 1.88 LME    OARRS:  Payal Jewell MD on 7/15/2025  2:10 PM  I have personally reviewed the OARRS report for Sherita Will. I have considered the risks of abuse, dependence, addiction and diversion and I believe that it is clinically appropriate for Sherita Will to be prescribed this medication    Is the patient prescribed a combination of a benzodiazepine and opioid?  No    Last Urine Drug Screen / ordered today: No  Recent Results (from the past 8760 hours)   Drug Screen, Urine With Reflex to Confirmation    Collection Time: 12/02/24  3:09 PM   Result Value Ref Range    Amphetamine Screen, Urine Presumptive Negative Presumptive Negative    Barbiturate Screen, Urine Presumptive Negative Presumptive Negative    Benzodiazepines Screen, Urine Presumptive Negative Presumptive Negative    Cannabinoid Screen, Urine Presumptive Negative Presumptive Negative    Cocaine Metabolite Screen, Urine Presumptive Negative Presumptive Negative    Fentanyl Screen, Urine Presumptive Negative Presumptive Negative    Opiate Screen, Urine Presumptive Negative Presumptive Negative    Oxycodone Screen, Urine Presumptive Negative Presumptive Negative     PCP Screen, Urine Presumptive Negative Presumptive Negative    Methadone Screen, Urine Presumptive Negative Presumptive Negative     Results are as expected.       Controlled Substance Agreement:  Date of the Last Agreement: 12/2/24  Reviewed Controlled Substance Agreement including but not limited to the benefits, risks, and alternatives to treatment with a Controlled Substance medication(s).    Benzodiazepines:  What is the patient's goal of therapy? Relief of acute anxiety   Is this being achieved with current treatment? yes      Activities of Daily Living:   Is your overall impression that this patient is benefiting (symptom reduction outweighs side effects) from benzodiazepine therapy? Yes     1. Physical Functioning: Better  2. Family Relationship: Same  3. Social Relationship: Same  4. Mood: Better  5. Sleep Patterns: Same  6. Overall Function: Better    REVIEW OF SYSTEMS  Review of Systems   Constitutional:  Positive for fatigue. Negative for chills and fever.   Respiratory:  Negative for cough and shortness of breath.    Cardiovascular:  Negative for chest pain, palpitations and leg swelling.   Gastrointestinal:  Negative for abdominal pain, constipation, diarrhea, nausea and vomiting.   Skin:  Positive for rash.   Neurological:  Negative for dizziness, light-headedness and headaches.   Psychiatric/Behavioral:  Negative for confusion and dysphoric mood. The patient is nervous/anxious.        ALLERGIES  Patient has no known allergies.    MEDICATIONS  Current Outpatient Medications   Medication Instructions    citalopram (CELEXA) 10 mg, oral, Daily    hydroCHLOROthiazide (HYDRODIURIL) 25 mg, oral, Daily    LORazepam (ATIVAN) 0.5 mg, oral, Every 6 hours PRN    naproxen (Naprosyn) 500 mg tablet 1 tablet, Every 12 hours PRN    nebivolol (BYSTOLIC) 10 mg, oral, Daily    omeprazole (PRILOSEC) 20 mg, oral, Daily    tirzepatide (weight loss) (ZEPBOUND) 2.5 mg, subcutaneous, Every 7 days    Velivet Triphasic  "Regimen, 28, 0.1/.125/.15-25 mg-mcg tablet 1 tablet, Daily       TOBACCO USE  Tobacco Use History[1]    DEPRESSION SCREEN       SURGICAL HISTORY  Past Surgical History:  12/22/2014: MOUTH SURGERY      Comment:  Oral Surgery Tooth Extraction  12/22/2014: OTHER SURGICAL HISTORY      Comment:  Labioplasty       OBJECTIVE    /79   Pulse 86   Temp 36.2 °C (97.2 °F)   Ht 1.626 m (5' 4\")   Wt 109 kg (240 lb 1.6 oz)   SpO2 98%   BMI 41.21 kg/m²    BMI: Estimated body mass index is 41.21 kg/m² as calculated from the following:    Height as of this encounter: 1.626 m (5' 4\").    Weight as of this encounter: 109 kg (240 lb 1.6 oz).    BP Readings from Last 3 Encounters:   07/15/25 115/79   03/10/25 (!) 123/92   12/02/24 (!) 116/95      Wt Readings from Last 3 Encounters:   07/15/25 109 kg (240 lb 1.6 oz)   03/10/25 103 kg (227 lb 14.4 oz)   12/02/24 104 kg (228 lb 8 oz)        PHYSICAL EXAM  Physical Exam  Constitutional:       Appearance: Normal appearance. She is obese.   HENT:      Head: Normocephalic and atraumatic.   Cardiovascular:      Rate and Rhythm: Normal rate and regular rhythm.      Heart sounds: No murmur heard.  Pulmonary:      Effort: Pulmonary effort is normal. No respiratory distress.      Breath sounds: Normal breath sounds. No wheezing.   Musculoskeletal:      Right lower leg: No edema.      Left lower leg: No edema.   Neurological:      General: No focal deficit present.      Mental Status: She is alert and oriented to person, place, and time. Mental status is at baseline.   Psychiatric:         Mood and Affect: Mood normal.         Behavior: Behavior normal.         Thought Content: Thought content normal.         Judgment: Judgment normal.          ASSESSMENT AND PLAN  Assessment/Plan   Problem List Items Addressed This Visit       Anxiety disorder - Primary    Relevant Medications    LORazepam (Ativan) 0.5 mg tablet    Benign essential hypertension    Relevant Medications    tirzepatide, " weight loss, (Zepbound) 2.5 mg/0.5 mL injection    Chronic prescription benzodiazepine use    Class 3 severe obesity due to excess calories with serious comorbidity and body mass index (BMI) of 40.0 to 44.9 in adult    Relevant Medications    tirzepatide, weight loss, (Zepbound) 2.5 mg/0.5 mL injection       Generalized anxiety with acute flare-ups - Continue citalopram, also uses lorazepam occasionally for acute anxiety.  Controlled substance agreement updated 12/2/24.  [place] reviewed.  Urine Drug Screen 12/2/24.  Lorazepam refilled.  Follow-up 90 days.    Obesity with comorbidity - BMI today 41.2.  Start Zepbound.  Typical dosing reviewed with patient.       Hypertension - at goal, continue current meds,      Routine fasting labs ordered previously, reminded patient to have these drawn.      Follow-up 90 days.                [1]   Social History  Tobacco Use   Smoking Status Never   Smokeless Tobacco Never

## 2025-07-17 DIAGNOSIS — E66.813 CLASS 3 SEVERE OBESITY DUE TO EXCESS CALORIES WITH SERIOUS COMORBIDITY AND BODY MASS INDEX (BMI) OF 40.0 TO 44.9 IN ADULT: Primary | ICD-10-CM

## 2025-07-17 DIAGNOSIS — E78.2 MIXED HYPERLIPIDEMIA: ICD-10-CM

## 2025-07-17 DIAGNOSIS — I10 BENIGN ESSENTIAL HYPERTENSION: ICD-10-CM

## 2025-07-20 RX ORDER — SEMAGLUTIDE 0.25 MG/.5ML
0.25 INJECTION, SOLUTION SUBCUTANEOUS WEEKLY
Qty: 2 ML | Refills: 0 | Status: SHIPPED | OUTPATIENT
Start: 2025-07-20 | End: 2025-08-11

## 2025-10-20 ENCOUNTER — APPOINTMENT (OUTPATIENT)
Dept: PRIMARY CARE | Facility: CLINIC | Age: 35
End: 2025-10-20
Payer: COMMERCIAL